# Patient Record
Sex: FEMALE | Race: WHITE | NOT HISPANIC OR LATINO | Employment: UNEMPLOYED | URBAN - METROPOLITAN AREA
[De-identification: names, ages, dates, MRNs, and addresses within clinical notes are randomized per-mention and may not be internally consistent; named-entity substitution may affect disease eponyms.]

---

## 2023-01-31 ENCOUNTER — TRANSCRIBE ORDERS (OUTPATIENT)
Dept: ADMINISTRATIVE | Facility: HOSPITAL | Age: 43
End: 2023-01-31
Payer: COMMERCIAL

## 2023-01-31 DIAGNOSIS — M54.2 CERVICAL PAIN: Primary | ICD-10-CM

## 2023-02-13 ENCOUNTER — HOSPITAL ENCOUNTER (OUTPATIENT)
Dept: PAIN MEDICINE | Facility: HOSPITAL | Age: 43
Discharge: HOME | End: 2023-02-13
Payer: COMMERCIAL

## 2024-11-01 ENCOUNTER — OFFICE VISIT (OUTPATIENT)
Dept: ORTHOPEDIC SURGERY | Facility: CLINIC | Age: 44
End: 2024-11-01
Payer: COMMERCIAL

## 2024-11-01 VITALS — BODY MASS INDEX: 27.12 KG/M2 | TEMPERATURE: 98.7 F | WEIGHT: 172.8 LBS | HEIGHT: 67 IN

## 2024-11-01 DIAGNOSIS — M75.40 IMPINGEMENT SYNDROME OF SHOULDER REGION, UNSPECIFIED LATERALITY: Primary | ICD-10-CM

## 2024-11-01 DIAGNOSIS — M25.511 BILATERAL SHOULDER PAIN, UNSPECIFIED CHRONICITY: ICD-10-CM

## 2024-11-01 DIAGNOSIS — M25.512 BILATERAL SHOULDER PAIN, UNSPECIFIED CHRONICITY: ICD-10-CM

## 2024-11-01 RX ORDER — DIPHENOXYLATE HYDROCHLORIDE AND ATROPINE SULFATE 2.5; .025 MG/1; MG/1
TABLET ORAL
COMMUNITY

## 2024-11-01 RX ORDER — LIDOCAINE HYDROCHLORIDE 10 MG/ML
2 INJECTION, SOLUTION EPIDURAL; INFILTRATION; INTRACAUDAL; PERINEURAL
Status: COMPLETED | OUTPATIENT
Start: 2024-11-01 | End: 2024-11-01

## 2024-11-01 RX ORDER — MELOXICAM 15 MG/1
TABLET ORAL
Qty: 30 TABLET | Refills: 0 | Status: SHIPPED | OUTPATIENT
Start: 2024-11-01

## 2024-11-01 RX ORDER — ESCITALOPRAM OXALATE 10 MG/1
10 TABLET ORAL DAILY
COMMUNITY
Start: 2024-08-05

## 2024-11-01 RX ORDER — METHYLPREDNISOLONE ACETATE 80 MG/ML
80 INJECTION, SUSPENSION INTRA-ARTICULAR; INTRALESIONAL; INTRAMUSCULAR; SOFT TISSUE
Status: COMPLETED | OUTPATIENT
Start: 2024-11-01 | End: 2024-11-01

## 2024-11-01 RX ORDER — FEXOFENADINE HCL 60 MG/1
60 TABLET, FILM COATED ORAL DAILY
COMMUNITY

## 2024-11-01 RX ADMIN — LIDOCAINE HYDROCHLORIDE 2 ML: 10 INJECTION, SOLUTION EPIDURAL; INFILTRATION; INTRACAUDAL; PERINEURAL at 11:10

## 2024-11-01 RX ADMIN — METHYLPREDNISOLONE ACETATE 80 MG: 80 INJECTION, SUSPENSION INTRA-ARTICULAR; INTRALESIONAL; INTRAMUSCULAR; SOFT TISSUE at 11:10

## 2024-11-01 NOTE — PROGRESS NOTES
[facility]              New Problem      Patient Name: Bella Jones  : 1980  Primary Care Physician: Provider, No Known        Chief Complaint: Bilateral shoulder pain    HPI:   Bella Jones is a 44 y.o. year old who presents today for evaluation.  History of Present Illness  The patient presents for evaluation of bilateral shoulder pain.    She reports experiencing pain in both shoulders for the past month, with no specific injury or event triggering the discomfort. The pain is located at the top of her shoulders and extends slightly downwards on both sides. As a right-handed individual, she has attempted to alleviate the pain with Aleve, but without success. She takes Aleve twice daily. The pain intensifies upon waking and when drying her back with a towel. She also experiences discomfort when putting on a jacket. She reports no numbness or tingling sensations. She has not been ill recently and has not received any vaccines.    She has a history of degenerative disc disease and carpal tunnel syndrome, which were diagnosed a few years ago following an MRI due to neck pain and numbness. She has previously received a cortisone injection for tendinitis, which was effective and the condition did not recur.    ALLERGIES  She is allergic to CODEINE and AMOXICILLIN.    Past Medical/Surgical, Social and Family History:  I have reviewed and/or updated pertinent history as noted in the medical record including:  No past medical history on file.  No past surgical history on file.  Social History     Occupational History    Not on file   Tobacco Use    Smoking status: Not on file    Smokeless tobacco: Not on file   Substance and Sexual Activity    Alcohol use: Not on file    Drug use: Not on file    Sexual activity: Not on file          Allergies:   Allergies   Allergen Reactions    Amoxicillin Itching    Codeine Nausea And Vomiting       Medications:   Home Medications:  Current Outpatient Medications  on File Prior to Visit   Medication Sig    escitalopram (LEXAPRO) 10 MG tablet Take 1 tablet by mouth Daily.    fexofenadine (ALLEGRA) 60 MG tablet Take 1 tablet by mouth Daily.    multivitamin (THERAGRAN) tablet tablet Take  by mouth.    OMEPRAZOLE PO Take  by mouth.     No current facility-administered medications on file prior to visit.         ROS:  ROS negative except as listed in the HPI.    Physical Exam:   44 y.o. female  Body mass index is 27.06 kg/m²., 78.4 kg (172 lb 12.8 oz)  Vitals:    11/01/24 1051   Temp: 98.7 °F (37.1 °C)     General: Alert, cooperative, appears well and in no observable distress. Appears stated age and BMI as listed above.  HEENT: Normocephalic, atraumatic on external visual inspection.  CV: No significant peripheral edema.  Respiratory: Normal respiratory effort.  Skin: Warm & well perfused; appropriate skin turgor.  Psych: Appropriate mood & affect.  Neuro: Gross sensation and motor intact in affected extremity/extremities.  Vascular: Peripheral pulses palpable in affected extremity/extremities.   Physical Exam      MSK Exam:    Bilateral Shoulder  No obvious deformities or wounds.   No redness or significant swelling.  +Definitive painful arc.  +Impingement signs  Flexion 180  ER 70  IR lumbar spine  Strength is 4+/5 strength with isometric testing of the rotator cuff and painful    Brief examination of the cervical spine did not reproduce any specific radicular pattern or symptoms.   Strength is reasonable and symmetric in the upper extremities.       Radiology:    Results      The following X-rays were ordered/reviewed today to evaluate the patient's symptoms: Shoulder: AP, Scapular Y and Axillary Lateral of both shoulder(s) show Left shoulder has some mild acromioclavicular joint changes and a little bit of sclerosis at the greater tuberosity of the humerus.  There is a very small ossific density at the inferior aspect of the glenoid that could represent a little degenerative  osteophyte or loose body otherwise she has good glenohumeral space no significant bony pathology is otherwise noted.  On the right shoulder she has a little more acromioclavicular joint arthritis, she has similar changes at the greater tuberosity of the humerus humeral head is well-seated in the glenoid and she has good subacromial and glenohumeral space.. There are no prior films available for direct comparison.    Procedure:   See Procedure Note: The potential risks and benefits of performing a diagnostic and therapeutic injection were discussed with the patient prior to procedure. Risks include, but are not limited to infection, swelling, transient increase in pain, bleeding, bruising. Patient was advised that injections are a diagnostic and therapeutic tool meaning they may not alleviate symptoms at all, or may only provide partial or temporary relief. Injection precautions and aftercare discussed.    Large Joint Arthrocentesis: R subacromial bursa  Date/Time: 11/1/2024 11:10 AM  Consent given by: patient  Site marked: site marked  Timeout: Immediately prior to procedure a time out was called to verify the correct patient, procedure, equipment, support staff and site/side marked as required   Supporting Documentation  Indications: pain   Procedure Details  Location: shoulder - R subacromial bursa  Preparation: Patient was prepped and draped in the usual sterile fashion  Needle gauge: 21G.  Approach: posterior  Medications administered: 80 mg methylPREDNISolone acetate 80 MG/ML; 2 mL lidocaine PF 1% 1 %  Patient tolerance: patient tolerated the procedure well with no immediate complications      Large Joint Arthrocentesis: L subacromial bursa  Date/Time: 11/1/2024 11:10 AM  Consent given by: patient  Site marked: site marked  Timeout: Immediately prior to procedure a time out was called to verify the correct patient, procedure, equipment, support staff and site/side marked as required   Supporting  Documentation  Indications: pain   Procedure Details  Location: shoulder - L subacromial bursa  Preparation: Patient was prepped and draped in the usual sterile fashion  Needle gauge: 21G.  Approach: posterior  Medications administered: 80 mg methylPREDNISolone acetate 80 MG/ML; 2 mL lidocaine PF 1% 1 %  Patient tolerance: patient tolerated the procedure well with no immediate complications        Misc. Data/Labs: N/A    Assessment & Plan:      ICD-10-CM ICD-9-CM   1. Impingement syndrome of shoulder region, unspecified laterality  M75.40 726.2   2. Bilateral shoulder pain, unspecified chronicity  M25.511 719.41    M25.512      New Medications Ordered This Visit   Medications    meloxicam (MOBIC) 15 MG tablet     Si PO Daily with food. Do not take with other NSAIDS.     Dispense:  30 tablet     Refill:  0     Orders Placed This Encounter   Procedures    Large Joint Arthrocentesis: R subacromial bursa    Large Joint Arthrocentesis: L subacromial bursa    XR Shoulder 2+ View Bilateral       Assessment & Plan  1. Bilateral shoulder pain.  The patient's symptoms suggest possible rotator cuff impingement or bicep tendinitis, characterized by inflammation and irritation in the front of the shoulder radiating down the arm. The presence of arthritis in the AC joint is likely age-related and not a cause for concern but could potentially contribute. A stronger anti-inflammatory medication will be prescribed for a couple of weeks, I have sent meloxicam over with strict precautions. Physical therapy is recommended to strengthen the shoulder and prevent recurrence, we will hold off on this for now and see how the injections work as a diagnostic tool. A cortisone injection will be administered today, with the understanding that it may take up to 2 weeks to see improvement. If there is no improvement after 2 weeks, further investigation will be required to identify the underlying issue.      Continue with activity  modifications as needed/discussed.  Continue ICE and/or HEAT PRN.  Recommend to continue activity as tolerated, focus on stretching and strengthening of the joint.    Focus on posture and body mechanics to improve/prevent symptoms.   Patient encouraged to call with questions or concerns prior to follow up.  Will discuss with attending as needed.  Consider additional referrals, work up and/or advanced imaging as indicated or if patient fails to respond to conservative care.    Return in about 4 weeks (around 11/29/2024) for Recheck. If symptoms change call for sooner appt..    Patient or patient representative verbalized consent for the use of Ambient Listening during the visit with  ROSA Gavin for chart documentation. 11/1/2024  15:24 EDT     ROSA Urban    Dictation software was used to complete a portion or all of this note.

## 2024-11-26 DIAGNOSIS — M25.511 BILATERAL SHOULDER PAIN, UNSPECIFIED CHRONICITY: Primary | ICD-10-CM

## 2024-11-26 DIAGNOSIS — M25.512 BILATERAL SHOULDER PAIN, UNSPECIFIED CHRONICITY: Primary | ICD-10-CM

## 2024-11-26 RX ORDER — MELOXICAM 15 MG/1
TABLET ORAL
Qty: 30 TABLET | Refills: 0 | Status: SHIPPED | OUTPATIENT
Start: 2024-11-26

## 2025-01-28 DIAGNOSIS — M25.511 BILATERAL SHOULDER PAIN, UNSPECIFIED CHRONICITY: ICD-10-CM

## 2025-01-28 DIAGNOSIS — M25.512 BILATERAL SHOULDER PAIN, UNSPECIFIED CHRONICITY: ICD-10-CM

## 2025-01-28 RX ORDER — MELOXICAM 15 MG/1
TABLET ORAL
Qty: 30 TABLET | Refills: 0 | Status: SHIPPED | OUTPATIENT
Start: 2025-01-28 | End: 2025-02-03

## 2025-02-03 ENCOUNTER — OFFICE VISIT (OUTPATIENT)
Dept: ORTHOPEDIC SURGERY | Facility: CLINIC | Age: 45
End: 2025-02-03
Payer: COMMERCIAL

## 2025-02-03 VITALS — BODY MASS INDEX: 29.32 KG/M2 | HEIGHT: 67 IN | TEMPERATURE: 98.1 F | WEIGHT: 186.8 LBS

## 2025-02-03 DIAGNOSIS — M25.819 IMPINGEMENT OF SHOULDER: Primary | ICD-10-CM

## 2025-02-03 DIAGNOSIS — M25.512 BILATERAL SHOULDER PAIN, UNSPECIFIED CHRONICITY: ICD-10-CM

## 2025-02-03 DIAGNOSIS — M25.511 BILATERAL SHOULDER PAIN, UNSPECIFIED CHRONICITY: ICD-10-CM

## 2025-02-03 RX ADMIN — METHYLPREDNISOLONE ACETATE 80 MG: 80 INJECTION, SUSPENSION INTRA-ARTICULAR; INTRALESIONAL; INTRAMUSCULAR; SOFT TISSUE at 15:10

## 2025-02-03 RX ADMIN — METHYLPREDNISOLONE ACETATE 80 MG: 80 INJECTION, SUSPENSION INTRA-ARTICULAR; INTRALESIONAL; INTRAMUSCULAR; SOFT TISSUE at 15:09

## 2025-02-03 RX ADMIN — LIDOCAINE HYDROCHLORIDE 2 ML: 10 INJECTION, SOLUTION EPIDURAL; INFILTRATION; INTRACAUDAL; PERINEURAL at 15:10

## 2025-02-03 RX ADMIN — LIDOCAINE HYDROCHLORIDE 2 ML: 10 INJECTION, SOLUTION EPIDURAL; INFILTRATION; INTRACAUDAL; PERINEURAL at 15:09

## 2025-02-03 NOTE — PROGRESS NOTES
[facility]              Follow Up      Patient Name: Bella Jones  : 1980  Primary Care Physician: Provider, No Known        Chief Complaint:  Bilateral shoulder pain    HPI:   Bella Jones is a 45 y.o. year old who presents today for evaluation.  History of Present Illness  The patient presents for evaluation of bilateral shoulder pain.    She reports a recurrence of bilateral shoulder pain, which began approximately 1 month ago. The right shoulder is more painful than the left. She experienced a period of relief for several months prior to this episode. The pain was severe enough to disrupt her sleep last night. She also notes occasional tenderness upon touch and discomfort when changing positions in bed. She has not been using Voltaren gel. She has discontinued the use of meloxicam due to perceived ineffectiveness and has been self-medicating with 2 tablets of naproxen instead. She recalls a previous episode of tingling sensations originating from her neck a few years ago.    I last saw her about 3 months ago, we suspected RTC impingement and did an injection as a diagnostic and therapeutic tool. She did have good relief with the injections but her pain as returned.    MEDICATIONS  Discontinued: Meloxicam.  Current: Naproxen, Lexapro.       Past Medical/Surgical, Social and Family History:  I have reviewed and/or updated pertinent history as noted in the medical record including:  History reviewed. No pertinent past medical history.  History reviewed. No pertinent surgical history.  Social History     Occupational History    Not on file   Tobacco Use    Smoking status: Never     Passive exposure: Never    Smokeless tobacco: Never   Vaping Use    Vaping status: Never Used   Substance and Sexual Activity    Alcohol use: Yes    Drug use: Never    Sexual activity: Yes     Partners: Male          Allergies:   Allergies   Allergen Reactions    Amoxicillin Itching    Codeine Nausea And Vomiting        Medications:   Home Medications:  Current Outpatient Medications on File Prior to Visit   Medication Sig    escitalopram (LEXAPRO) 10 MG tablet Take 1 tablet by mouth Daily.    fexofenadine (ALLEGRA) 60 MG tablet Take 1 tablet by mouth Daily.    multivitamin (THERAGRAN) tablet tablet Take  by mouth.    OMEPRAZOLE PO Take  by mouth.     No current facility-administered medications on file prior to visit.         ROS:  ROS negative except as listed in the HPI.    Physical Exam:   45 y.o. female  Body mass index is 29.26 kg/m²., 84.7 kg (186 lb 12.8 oz)  Vitals:    02/03/25 1513   Temp: 98.1 °F (36.7 °C)     General: Alert, cooperative, appears well and in no observable distress. Appears stated age and BMI as listed above.  HEENT: Normocephalic, atraumatic on external visual inspection.  CV: No significant peripheral edema.  Respiratory: Normal respiratory effort.  Skin: Warm & well perfused; appropriate skin turgor.  Psych: Appropriate mood & affect.  Neuro: Gross sensation and motor intact in affected extremity/extremities.  Vascular: Peripheral pulses palpable in affected extremity/extremities.   Physical Exam  Bilateral shoulder exam was performed.    MSK Exam:  Bilateral Shoulder  No obvious deformities or wounds.   No redness or significant swelling.  +Definitive painful arc.  +Impingement signs  Flexion 180  ER 70  IR lumbar spine  Strength is 4+/5 strength with isometric testing of the rotator cuff and painful     Brief examination of the cervical spine did not reproduce any specific radicular pattern or symptoms.   Strength is reasonable and symmetric in the upper extremities.     Radiology:    No new images were needed at the visit today.     11/01/2024: Shoulder: AP, Scapular Y and Axillary Lateral of both shoulder(s) show Left shoulder has some mild acromioclavicular joint changes and a little bit of sclerosis at the greater tuberosity of the humerus.  There is a very small ossific density at the  inferior aspect of the glenoid that could represent a little degenerative osteophyte or loose body otherwise she has good glenohumeral space no significant bony pathology is otherwise noted.  On the right shoulder she has a little more acromioclavicular joint arthritis, she has similar changes at the greater tuberosity of the humerus humeral head is well-seated in the glenoid and she has good subacromial and glenohumeral space.. There are no prior films available for direct comparison.   Results      Procedure:   See Procedure Note: The potential risks and benefits of performing a diagnostic and therapeutic injection were discussed with the patient prior to procedure. Risks include, but are not limited to infection, swelling, transient increase in pain, bleeding, bruising. Patient was advised that injections are a diagnostic and therapeutic tool meaning they may not alleviate symptoms at all, or may only provide partial or temporary relief. Injection precautions and aftercare discussed.      [facility] [place]/Labs: N/A    Assessment & Plan:        ICD-10-CM ICD-9-CM   1. Impingement of shoulder  M25.819 719.81   2. Bilateral shoulder pain, unspecified chronicity  M25.511 719.41    M25.512      No orders of the defined types were placed in this encounter.    Orders Placed This Encounter   Procedures    Large Joint Arthrocentesis: R subacromial bursa    Large Joint Arthrocentesis: L subacromial bursa    Ambulatory Referral to Physical Therapy for Evaluation & Treatment       Assessment & Plan  1. Bilateral shoulder pain.  The patient's symptoms suggest a recurrence of shoulder pain rather than neck pain, given the significant relief previously achieved through injections. I recommended Voltaren gel, with instructions to apply it to the anterior and lateral aspects of the shoulder where discomfort is most pronounced. A referral for physical therapy has also been made, with the aim of strengthening the upper back and shoulder  muscles to prevent future episodes of pain. She has been advised to contact the office within 6 weeks to report on her progress. If the pain persists beyond this period, an MRI will be ordered.    PROCEDURE  The patient previously received an injection for shoulder pain, which provided significant relief.    Continue with activity modifications as needed/discussed.  Continue ICE and/or HEAT PRN.  Recommend to continue activity as tolerated, focus on stretching and strengthening of the joint.    Focus on posture and body mechanics to improve/prevent symptoms.   Patient encouraged to call with questions or concerns prior to follow up.  Will discuss with attending as needed.  Consider additional referrals, work up and/or advanced imaging as indicated or if patient fails to respond to conservative care.    Return in about 6 weeks (around 3/17/2025) for Recheck. If symptoms change call for sooner appt..  Patient or patient representative verbalized consent for the use of Ambient Listening during the visit with  ROSA Gavin for chart documentation. 2/6/2025  09:58 EST    ROSA Urban    Dictation software was used to complete a portion or all of this note.    Large Joint Arthrocentesis: R subacromial bursa  Date/Time: 2/3/2025 3:09 PM  Consent given by: patient  Site marked: site marked  Timeout: Immediately prior to procedure a time out was called to verify the correct patient, procedure, equipment, support staff and site/side marked as required   Supporting Documentation  Indications: pain   Procedure Details  Location: shoulder - R subacromial bursa  Preparation: Patient was prepped and draped in the usual sterile fashion  Needle gauge: 21G.  Approach: posterior  Medications administered: 80 mg methylPREDNISolone acetate 80 MG/ML; 2 mL lidocaine PF 1% 1 %  Patient tolerance: patient tolerated the procedure well with no immediate complications      Large Joint Arthrocentesis: L subacromial  bursa  Date/Time: 2/3/2025 3:10 PM  Consent given by: patient  Site marked: site marked  Timeout: Immediately prior to procedure a time out was called to verify the correct patient, procedure, equipment, support staff and site/side marked as required   Supporting Documentation  Indications: pain   Procedure Details  Location: shoulder - L subacromial bursa  Preparation: Patient was prepped and draped in the usual sterile fashion  Needle gauge: 21G.  Approach: posterior  Medications administered: 80 mg methylPREDNISolone acetate 80 MG/ML; 2 mL lidocaine PF 1% 1 %  Patient tolerance: patient tolerated the procedure well with no immediate complications

## 2025-02-06 RX ORDER — METHYLPREDNISOLONE ACETATE 80 MG/ML
80 INJECTION, SUSPENSION INTRA-ARTICULAR; INTRALESIONAL; INTRAMUSCULAR; SOFT TISSUE
Status: COMPLETED | OUTPATIENT
Start: 2025-02-03 | End: 2025-02-03

## 2025-02-06 RX ORDER — LIDOCAINE HYDROCHLORIDE 10 MG/ML
2 INJECTION, SOLUTION EPIDURAL; INFILTRATION; INTRACAUDAL; PERINEURAL
Status: COMPLETED | OUTPATIENT
Start: 2025-02-03 | End: 2025-02-03

## 2025-07-09 ENCOUNTER — HOSPITAL ENCOUNTER (EMERGENCY)
Facility: HOSPITAL | Age: 45
Discharge: HOME | End: 2025-07-09
Attending: EMERGENCY MEDICINE | Admitting: EMERGENCY MEDICINE
Payer: COMMERCIAL

## 2025-07-09 ENCOUNTER — APPOINTMENT (OUTPATIENT)
Dept: GENERAL RADIOLOGY | Facility: HOSPITAL | Age: 45
End: 2025-07-09
Payer: COMMERCIAL

## 2025-07-09 ENCOUNTER — APPOINTMENT (OUTPATIENT)
Dept: CARDIOLOGY | Facility: HOSPITAL | Age: 45
End: 2025-07-09
Payer: COMMERCIAL

## 2025-07-09 VITALS
RESPIRATION RATE: 18 BRPM | TEMPERATURE: 98.2 F | OXYGEN SATURATION: 100 % | DIASTOLIC BLOOD PRESSURE: 97 MMHG | HEIGHT: 67 IN | BODY MASS INDEX: 28.25 KG/M2 | HEART RATE: 89 BPM | SYSTOLIC BLOOD PRESSURE: 151 MMHG | WEIGHT: 180 LBS

## 2025-07-09 DIAGNOSIS — I10 HYPERTENSION, UNSPECIFIED TYPE: ICD-10-CM

## 2025-07-09 DIAGNOSIS — M25.462 KNEE EFFUSION, LEFT: ICD-10-CM

## 2025-07-09 DIAGNOSIS — M25.561 ACUTE PAIN OF RIGHT KNEE: Primary | ICD-10-CM

## 2025-07-09 LAB
BH CV LOWER VASCULAR LEFT COMMON FEMORAL AUGMENT: NORMAL
BH CV LOWER VASCULAR LEFT COMMON FEMORAL COMPETENT: NORMAL
BH CV LOWER VASCULAR LEFT COMMON FEMORAL COMPRESS: NORMAL
BH CV LOWER VASCULAR LEFT COMMON FEMORAL PHASIC: NORMAL
BH CV LOWER VASCULAR LEFT COMMON FEMORAL SPONT: NORMAL
BH CV LOWER VASCULAR RIGHT COMMON FEMORAL AUGMENT: NORMAL
BH CV LOWER VASCULAR RIGHT COMMON FEMORAL COMPETENT: NORMAL
BH CV LOWER VASCULAR RIGHT COMMON FEMORAL COMPRESS: NORMAL
BH CV LOWER VASCULAR RIGHT COMMON FEMORAL PHASIC: NORMAL
BH CV LOWER VASCULAR RIGHT COMMON FEMORAL SPONT: NORMAL
BH CV LOWER VASCULAR RIGHT DISTAL FEMORAL COMPRESS: NORMAL
BH CV LOWER VASCULAR RIGHT GASTRONEMIUS COMPRESS: NORMAL
BH CV LOWER VASCULAR RIGHT GREATER SAPH AK COMPRESS: NORMAL
BH CV LOWER VASCULAR RIGHT GREATER SAPH BK COMPRESS: NORMAL
BH CV LOWER VASCULAR RIGHT LESSER SAPH COMPRESS: NORMAL
BH CV LOWER VASCULAR RIGHT MID FEMORAL AUGMENT: NORMAL
BH CV LOWER VASCULAR RIGHT MID FEMORAL COMPETENT: NORMAL
BH CV LOWER VASCULAR RIGHT MID FEMORAL COMPRESS: NORMAL
BH CV LOWER VASCULAR RIGHT MID FEMORAL PHASIC: NORMAL
BH CV LOWER VASCULAR RIGHT MID FEMORAL SPONT: NORMAL
BH CV LOWER VASCULAR RIGHT PERONEAL COMPRESS: NORMAL
BH CV LOWER VASCULAR RIGHT POPLITEAL AUGMENT: NORMAL
BH CV LOWER VASCULAR RIGHT POPLITEAL COMPETENT: NORMAL
BH CV LOWER VASCULAR RIGHT POPLITEAL COMPRESS: NORMAL
BH CV LOWER VASCULAR RIGHT POPLITEAL PHASIC: NORMAL
BH CV LOWER VASCULAR RIGHT POPLITEAL SPONT: NORMAL
BH CV LOWER VASCULAR RIGHT POSTERIOR TIBIAL COMPRESS: NORMAL
BH CV LOWER VASCULAR RIGHT PROFUNDA FEMORAL COMPRESS: NORMAL
BH CV LOWER VASCULAR RIGHT PROXIMAL FEMORAL COMPRESS: NORMAL
BH CV LOWER VASCULAR RIGHT SAPHENOFEMORAL JUNCTION COMPRESS: NORMAL
BH CV VAS PRELIMINARY FINDINGS SCRIPTING: 1

## 2025-07-09 PROCEDURE — 99284 EMERGENCY DEPT VISIT MOD MDM: CPT

## 2025-07-09 PROCEDURE — 93971 EXTREMITY STUDY: CPT

## 2025-07-09 PROCEDURE — 93971 EXTREMITY STUDY: CPT | Performed by: SURGERY

## 2025-07-09 PROCEDURE — 73560 X-RAY EXAM OF KNEE 1 OR 2: CPT

## 2025-07-09 RX ORDER — IBUPROFEN 800 MG/1
800 TABLET, FILM COATED ORAL ONCE
Status: COMPLETED | OUTPATIENT
Start: 2025-07-09 | End: 2025-07-09

## 2025-07-09 RX ORDER — ACETAMINOPHEN 500 MG
1000 TABLET ORAL ONCE
Status: COMPLETED | OUTPATIENT
Start: 2025-07-09 | End: 2025-07-09

## 2025-07-09 RX ORDER — IBUPROFEN 600 MG/1
600 TABLET, FILM COATED ORAL EVERY 6 HOURS PRN
Qty: 30 TABLET | Refills: 0 | Status: SHIPPED | OUTPATIENT
Start: 2025-07-09

## 2025-07-09 RX ORDER — ACETAMINOPHEN 500 MG
1000 TABLET ORAL EVERY 8 HOURS PRN
Qty: 30 TABLET | Refills: 0 | Status: SHIPPED | OUTPATIENT
Start: 2025-07-09

## 2025-07-09 RX ADMIN — ACETAMINOPHEN 1000 MG: 500 TABLET, FILM COATED ORAL at 19:36

## 2025-07-09 RX ADMIN — IBUPROFEN 800 MG: 800 TABLET, FILM COATED ORAL at 19:36

## 2025-07-09 NOTE — ED PROVIDER NOTES
EMERGENCY DEPARTMENT ENCOUNTER  Room Number:  30/30  Date of encounter:  7/9/2025  PCP: Stephany Osuna APRN  Patient Care Team:  Stephany Osuna APRN as PCP - General (Nurse Practitioner)  Wilmer Ritchie APRN ([facility])     HPI:  Context: Bella Jones is a 45 y.o. female who presents to the ED c/o chief complaint of pain.  Patient reports that she has been having right knee pain for the last 10 to 11 days.  No fall or trauma, no strain inside event.  Patient reports pain is worsened with movement, patient also complaining of pain in her anterior thigh as well as some pain in her calf.  No redness warmth or swelling, no back pain, no radicular pain, no weakness or numbness.    MEDICAL HISTORY REVIEW  Reviewed in EPIC    PAST MEDICAL HISTORY  Active Ambulatory Problems     Diagnosis Date Noted    No Active Ambulatory Problems     Resolved Ambulatory Problems     Diagnosis Date Noted    No Resolved Ambulatory Problems     No Additional Past Medical History       PAST SURGICAL HISTORY  No past surgical history on file.    FAMILY HISTORY  No family history on file.    SOCIAL HISTORY  Social History     Socioeconomic History    Marital status:    Tobacco Use    Smoking status: Never     Passive exposure: Never    Smokeless tobacco: Never   Vaping Use    Vaping status: Never Used   Substance and Sexual Activity    Alcohol use: Yes    Drug use: Never    Sexual activity: Yes     Partners: Male       ALLERGIES  Amoxicillin and Codeine    The patient's allergies have been reviewed    PHYSICAL EXAM  I have reviewed the triage vital signs and nursing notes.  ED Triage Vitals   Temp Heart Rate Resp BP SpO2   07/09/25 1818 07/09/25 1818 07/09/25 1818 07/09/25 1819 07/09/25 1818   98.1 °F (36.7 °C) 81 18 (!) 170/105 99 %      Temp src Heart Rate Source Patient Position BP Location FiO2 (%)   -- -- -- -- --              General: No acute distress.  HENT: NCAT, PERRL, Nares patent.  Eyes: no  scleral icterus.  Neck: trachea midline, no ROM limitations.  CV: regular rhythm, regular rate.  Respiratory: normal effort, CTAB.  Abdomen: soft, nondistended, NTTP, no rebound tenderness, no guarding or rigidity.  Musculoskeletal: no deformity.  Right lower extremity: Normal in appearance, patient does have some tenderness along the anterior joint line of the knee, no crepitus or deformity, no swelling or effusion, full extension, slightly limited flexion secondary to pain, no redness warmth or swelling, no palpable cords, negative Homans.  Neuro: alert, moves all extremities, follows commands.  Skin: warm, dry.    LAB RESULTS  Recent Results (from the past 24 hours)   Duplex Venous Lower Extremity Right    Collection Time: 07/09/25  6:51 PM   Result Value Ref Range    Right Common Femoral Spont Y     Right Common Femoral Competent Y     Right Common Femoral Phasic Y     Right Common Femoral Compress C     Right Common Femoral Augment Y     Right Saphenofemoral Junction Compress C     Right Profunda Femoral Compress C     Right Proximal Femoral Compress C     Right Mid Femoral Spont Y     Right Mid Femoral Competent Y     Right Mid Femoral Phasic Y     Right Mid Femoral Compress C     Right Mid Femoral Augment Y     Right Distal Femoral Compress C     Right Popliteal Spont Y     Right Popliteal Competent Y     Right Popliteal Phasic Y     Right Popliteal Compress C     Right Popliteal Augment Y     Right Posterior Tibial Compress C     Right Peroneal Compress C     Right Gastronemius Compress C     Right Greater Saph AK Compress C     Right Greater Saph BK Compress C     Right Lesser Saph Compress C     Left Common Femoral Spont Y     Left Common Femoral Competent Y     Left Common Femoral Phasic Y     Left Common Femoral Compress C     Left Common Femoral Augment Y     BH CV VAS PRELIMINARY FINDINGS SCRIPTING 1.0        I ordered the above labs and reviewed the results.    RADIOLOGY  XR Knee 1 or 2 View  Right  Result Date: 7/9/2025  XR KNEE 1 OR 2 VW RIGHT-  INDICATIONS: Pain  TECHNIQUE: 2 VIEWS OF THE RIGHT KNEE  COMPARISON: None available  FINDINGS:  No acute fracture, erosion, or dislocation is identified. Trace knee effusion. If there is clinical suspicion for internal derangement of the knee, MRI could be considered for further evaluation.       As described.    This report was finalized on 7/9/2025 7:29 PM by Dr. Robin Reed M.D on Workstation: [id number]        I ordered the above noted radiological studies. I reviewed the images and results. I agree with the radiologist interpretation.    PROCEDURES  Procedures    MEDICATIONS GIVEN IN ER  Medications   ibuprofen (ADVIL,MOTRIN) tablet 800 mg (800 mg Oral Given 7/9/25 1936)   acetaminophen (TYLENOL) tablet 1,000 mg (1,000 mg Oral Given 7/9/25 1936)       PROGRESS, DATA ANALYSIS, CONSULTS, AND MEDICAL DECISION MAKING  A complete history and physical exam have been performed.  All available laboratory and imaging results have been reviewed by myself prior to disposition.    MDM    After the initial H&P, I discussed pertinent information from history and physical exam with patient/family.  Discussed differential diagnosis.  Discussed plan for ED evaluation/workup/treatment.  All questions answered.  Patient/family is agreeable with plan.  ED Course as of 07/09/25 1954 Wed Jul 09, 2025   1855 Preliminary report per ultrasound is patient is negative for DVT/SVT in right lower extremity. [JG]   1932 I reviewed right knee x-rays in PACS, no fracture per my read. [JG]      ED Course User Index  [JG] Clarke Rowley MD       AS OF 19:54 EDT VITALS:    BP - 151/97  HR - 70  TEMP - 98.1 °F (36.7 °C)  O2 SATS - 98%    DIAGNOSIS  Final diagnoses:   Acute pain of right knee   Hypertension, unspecified type   Knee effusion, left         DISPOSITION  DISCHARGE    Patient discharged in stable condition.    Reviewed implications of results, diagnosis, meds,  responsibility to follow up, warning signs and symptoms of possible worsening, potential complications and reasons to return to ER.    Patient/Family voiced understanding of above instructions.    Discussed plan for discharge, as there is no emergent indication for admission. Patient referred to primary care provider for BP management due to today's BP. Pt/family is agreeable and understands need for follow up and repeat testing.  Pt is aware that discharge does not mean that nothing is wrong but it indicates no emergency is present that requires admission and they must continue care with follow-up as given below or physician of their choice.     FOLLOW-UP  Stephany Osuna N, APRN  [address] [facility] [id number] [phone]    Schedule an appointment as soon as possible for a visit in 2 days      Jerry Wilkins II, MD  [address] [facility] [address]  [place] [id number] [phone]    Schedule an appointment as soon as possible for a visit in 2 days           Medication List        New Prescriptions      acetaminophen 500 MG tablet  Commonly known as: TYLENOL  Take 2 tablets by mouth Every 8 (Eight) Hours As Needed for Mild Pain.     Diclofenac Sodium 1 % gel gel  Commonly known as: VOLTAREN  Apply 4 g topically to the appropriate area as directed 4 (Four) Times a Day As Needed (pain).     ibuprofen 600 MG tablet  Commonly known as: ADVIL,MOTRIN  Take 1 tablet by mouth Every 6 (Six) Hours As Needed for Mild Pain.               Where to Get Your Medications        These medications were sent to [id number] - [place], [place] - [address] [address] AT SEC OF [place] [address] - [phone] [facility] [phone]   [address] [place] [address] [address], [facility] [address]      Phone: [phone]   acetaminophen 500 MG tablet  Diclofenac Sodium 1 % gel gel  ibuprofen 600 MG tablet            Clarke Rowley MD  07/09/25 6293

## 2025-07-09 NOTE — ED TRIAGE NOTES
Pt reports rt leg/knee pain that started 10-11 days ago, states tender to touch, denies known injury, [facility]; recent travel on a cruise

## 2025-07-11 ENCOUNTER — OFFICE VISIT (OUTPATIENT)
Dept: ORTHOPEDIC SURGERY | Facility: CLINIC | Age: 45
End: 2025-07-11
Payer: COMMERCIAL

## 2025-07-11 VITALS — BODY MASS INDEX: 28.3 KG/M2 | TEMPERATURE: 98.3 F | HEIGHT: 67 IN | WEIGHT: 180.3 LBS

## 2025-07-11 DIAGNOSIS — M25.561 ACUTE PAIN OF RIGHT KNEE: ICD-10-CM

## 2025-07-11 DIAGNOSIS — M25.461 EFFUSION OF RIGHT KNEE: Primary | ICD-10-CM

## 2025-07-11 RX ORDER — METHYLPREDNISOLONE 4 MG/1
TABLET ORAL
Qty: 21 TABLET | Refills: 0 | Status: SHIPPED | OUTPATIENT
Start: 2025-07-11

## 2025-07-11 NOTE — PROGRESS NOTES
[facility]              New Problem      Patient Name: Bella Jones  : 1980  Primary Care Physician: Stephany Osuna APRN        Chief Complaint:  Right knee pain, leg pain and hypersensitivity    HPI:   Bella Jones is a 45 y.o. year old who presents today for evaluation.  History of Present Illness  The patient presents for evaluation of right knee pain.    She reports experiencing pain in her right knee and leg, with no recollection of any specific injury. The pain is described as sudden and severe, extending down to her ankle. She has sought emergency care twice, initially being treated for shingles or a pinched nerve with muscle relaxers, which provided no relief. During her second ER visit, an ultrasound and x-ray revealed fluid in the joint, which was attributed to her hip pain. The US was a doppler and negative for DVT. She recalls waking up unable to bear weight on her right knee, which remains sensitive to touch. The pain is more pronounced on the inside of her knee, causing discomfort during activities such as shaving or dressing. She also experiences tightness and difficulty bending her knee, particularly when getting in and out of a car.     The patient mentions that her knee was bothersome during a recent vacation, and occasionally feels a need to pop it while walking. She returned from her trip on 2025, but the symptoms did not start until 2025. She reports no changes in bowel or bladder function or incontinence. She is currently taking ibuprofen 600 mg and alternating with tylenol.  She was prescribed methocarbamol, naproxen, and gabapentin for three days due to shooting pains. After her ER visit, she discontinued these medications and was advised to take ibuprofen and Tylenol for inflammation. She was also prescribed Voltaren cream, which she applied a few times but found it caused discomfort due to its cold temperature.    SOCIAL HISTORY  Occupations: Not  currently working    Past Medical/Surgical, Social and Family History:  I have reviewed and/or updated pertinent history as noted in the medical record including:  History reviewed. No pertinent past medical history.  History reviewed. No pertinent surgical history.  Social History     Occupational History    Not on file   Tobacco Use    Smoking status: Never     Passive exposure: Never    Smokeless tobacco: Never   Vaping Use    Vaping status: Never Used   Substance and Sexual Activity    Alcohol use: Yes    Drug use: Never    Sexual activity: Yes     Partners: Male          Allergies:   Allergies   Allergen Reactions    Amoxicillin Itching    Codeine Nausea And Vomiting       Medications:   Home Medications:  Current Outpatient Medications on File Prior to Visit   Medication Sig    acetaminophen (TYLENOL) 500 MG tablet Take 2 tablets by mouth Every 8 (Eight) Hours As Needed for Mild Pain.    Diclofenac Sodium (VOLTAREN) 1 % gel gel Apply 4 g topically to the appropriate area as directed 4 (Four) Times a Day As Needed (pain).    escitalopram (LEXAPRO) 10 MG tablet Take 1 tablet by mouth Daily.    fexofenadine (ALLEGRA) 60 MG tablet Take 1 tablet by mouth Daily.    ibuprofen (ADVIL,MOTRIN) 600 MG tablet Take 1 tablet by mouth Every 6 (Six) Hours As Needed for Mild Pain.    multivitamin (THERAGRAN) tablet tablet Take  by mouth.    OMEPRAZOLE PO Take  by mouth.     No current facility-administered medications on file prior to visit.         ROS:  ROS negative except as listed in the HPI.    Physical Exam:   45 y.o. female  Body mass index is 28.24 kg/m²., 81.8 kg (180 lb 4.8 oz)  Vitals:    07/11/25 1355   Temp: 98.3 °F (36.8 °C)     General: Alert, cooperative, appears well and in no observable distress. Appears stated age and BMI as listed above.  HEENT: Normocephalic, atraumatic on external visual inspection.  CV: No significant peripheral edema.  Respiratory: Normal respiratory effort.  Skin: Warm & well perfused;  appropriate skin turgor.  Psych: Appropriate mood & affect.  Neuro: Gross sensation and motor intact in affected extremity/extremities.  Vascular: Peripheral pulses palpable in affected extremity/extremities.   Physical Exam  Musculoskeletal:  Gait: Difficulty with weightbearing, hobbling.  Right knee: Tenderness on palpation, pain with movement, popping sensation, and fluid accumulation.  Right leg: Hypersensitivity to touch, pain extending from knee to ankle.    MSK Exam:  Right Knee  No deformity or wounds appreciated. No significant redness or warmth.  Trace effusion noted  Tenderness along the joint line appreciated patellofemoral compartment. She does have some hypersensitivity over the skin somewhat diffuse. No rashes or lesions.  ROM 2-100, +crepitus  Ligamentous exam grossly stable  Negative Leeroy  +Bounce Home  Quad strength 4 /5 and painful, Ham 5/5, hip flexion 4+/5. DF/PF 5/5     Left Knee  No deformity or wounds appreciated. No significant redness or warmth.  No significant effusion noted.  No significant tenderness appreciated about the joint.  ROM 0-130 and painless.  Ligamentous exam grossly stable  Quad strength 4+ to 5/5    Brief hip exam in the affected extremity(ies) grossly unremarkable.  Moves ankle and toes up and down, no significant pain or swelling in the foot, ankle or calf. No significant pain with passive SLR of the RLE.         Radiology:    The following X-rays were ordered/reviewed today to evaluate the patient's symptoms: Single Knee: AP standing and sunrise views of both knees, and lateral view of painful knee show Some mild degenerative changes in both knees on the AP view.  She still has adequate joint space in the weightbearing compartments.  Lateral view suggest little subtle patellofemoral arthritis I appreciate an effusion otherwise no acute or chronic bony pathology to account for symptoms.  I compared these with her prior films from the ER which were not on standing  films.  Results  Imaging   - Ultrasound of the knee: Negative for DVT   - X-ray of the knee: Showed fluid in the joint    Procedure:   N/A      Misc. Data/Labs: N/A    Assessment & Plan:      ICD-10-CM ICD-9-CM   1. Effusion of right knee  M25.461 719.06   2. Acute pain of right knee  M25.561 719.46     New Medications Ordered This Visit   Medications    methylPREDNISolone (MEDROL) 4 MG dose pack     Sig: Use as directed by package instructions     Dispense:  21 tablet     Refill:  0     Orders Placed This Encounter   Procedures    XR Knee 3 View Right       Assessment & Plan  1. Right knee pain:  The symptoms are somewhat atypical, with hypersensitivity suggesting a possible nerve issue. However, the absence of sciatica-like symptoms and good strength in distal extremities make me think this is more pressure from a swollen knee however her effusion doesn't seem that large.     A long car ride could have exacerbated this condition, with swelling developing over time. Especially given that she was quite a bit more active on her vacation, walking on the beach etc.     An oral steroid will be prescribed for a few days to assess its effectiveness. If it proves beneficial, it may indicate a nerve or inflammatory issue. Discontinue ibuprofen while on the oral steroid but continue with Tylenol. After the last day of the steroid course, resume ibuprofen. Frequent icing of the knee is recommended to help with swelling. If there is no noticeable improvement towards the end of the Dosepak course, contact us or send a message. I would like her to rest, stay off her feet as much as she can and even consider picking up a crutch in the opposite arm.     I might consider an IA injection if this does not improve but for today, given her presentation I think an oral steroid might offer more relief given her pattern of pain and discomfort.    Follow-up: A follow-up appointment is scheduled for 07/24/2025.    Continue with activity  modifications as needed/discussed.  Continue ICE and/or HEAT PRN.  Recommend to continue activity as tolerated, focus on quad and hip/core strengthening as well as gentle stretching.  Recommend lowering or maintaining BMI within a healthy range to reduce symptoms.   Patient encouraged to call with questions or concerns prior to follow up.  Will discuss with attending as needed.   Consider additional referrals, work up and/or advanced imaging as indicated or if patient fails to respond to conservative care.    Return in about 13 days (around 7/24/2025) for Recheck. If symptoms change call for sooner appt..    Patient or patient representative verbalized consent for the use of Ambient Listening during the visit with  ROSA Gavin for chart documentation. 7/11/2025  16:10 EDT        ROSA Urban    Dictation software was used to complete a portion or all of this note.

## 2025-07-24 ENCOUNTER — OFFICE VISIT (OUTPATIENT)
Dept: ORTHOPEDIC SURGERY | Facility: CLINIC | Age: 45
End: 2025-07-24
Payer: COMMERCIAL

## 2025-07-24 VITALS — TEMPERATURE: 98.4 F | BODY MASS INDEX: 28.12 KG/M2 | WEIGHT: 179.2 LBS | HEIGHT: 67 IN

## 2025-07-24 DIAGNOSIS — M22.2X1 PATELLOFEMORAL PAIN SYNDROME OF RIGHT KNEE: Primary | ICD-10-CM

## 2025-07-24 DIAGNOSIS — M25.561 ACUTE PAIN OF RIGHT KNEE: ICD-10-CM

## 2025-07-24 DIAGNOSIS — M54.10 RADICULAR PAIN OF LOWER EXTREMITY: ICD-10-CM

## 2025-07-24 RX ORDER — MELOXICAM 15 MG/1
15 TABLET ORAL DAILY
COMMUNITY
Start: 2025-07-15

## 2025-07-24 RX ORDER — METHYLPREDNISOLONE ACETATE 80 MG/ML
80 INJECTION, SUSPENSION INTRA-ARTICULAR; INTRALESIONAL; INTRAMUSCULAR; SOFT TISSUE
Status: COMPLETED | OUTPATIENT
Start: 2025-07-24 | End: 2025-07-24

## 2025-07-24 RX ORDER — NAPROXEN 500 MG/1
500 TABLET ORAL
COMMUNITY
Start: 2025-07-02

## 2025-07-24 RX ORDER — GABAPENTIN 300 MG/1
300 CAPSULE ORAL
COMMUNITY
Start: 2025-07-02

## 2025-07-24 RX ORDER — LIDOCAINE HYDROCHLORIDE 10 MG/ML
2 INJECTION, SOLUTION EPIDURAL; INFILTRATION; INTRACAUDAL; PERINEURAL
Status: COMPLETED | OUTPATIENT
Start: 2025-07-24 | End: 2025-07-24

## 2025-07-24 RX ADMIN — METHYLPREDNISOLONE ACETATE 80 MG: 80 INJECTION, SUSPENSION INTRA-ARTICULAR; INTRALESIONAL; INTRAMUSCULAR; SOFT TISSUE at 15:56

## 2025-07-24 RX ADMIN — LIDOCAINE HYDROCHLORIDE 2 ML: 10 INJECTION, SOLUTION EPIDURAL; INFILTRATION; INTRACAUDAL; PERINEURAL at 15:56

## 2025-07-24 NOTE — PROGRESS NOTES
[facility]              Follow Up      Patient Name: Bella Jones  : 1980  Primary Care Physician: Stephany Osuna APRN        Chief Complaint:  Right knee and leg pain    HPI:   Bella Jones is a 45 y.o. year old who presents today for evaluation.  I last saw her 2 weeks ago for complaints of right knee and leg pain.  She has somewhat of an atypical presentation with hypersensitivity suspicious for nerve pain, she did seem to have some pain and an effusion specific to the knee as well.  She had been to the emergency room and had imaging as well as a Doppler which was negative for DVT.  Initially they thought she may be having something along the lines of shingles but she never developed any sort of a rash.    Last visit we started her on Medrol Dosepak it was 1 thing that she had not tried thus far.  She did get some mild relief in her symptoms.  She also followed up with her primary care nurse practitioner who took a closer look at her lumbar spine with some x-rays and started her on meloxicam as well as gabapentin with an order for physical therapy.  She continues to have pain somewhat throughout the leg mostly anterior in the proximal thigh somewhat lateral but it does wrap around the medial portion as well and she continues to have hypersensitivity.  History of Present Illness        Past Medical/Surgical, Social and Family History:  I have reviewed and/or updated pertinent history as noted in the medical record including:  History reviewed. No pertinent past medical history.  History reviewed. No pertinent surgical history.  Social History     Occupational History    Not on file   Tobacco Use    Smoking status: Never     Passive exposure: Never    Smokeless tobacco: Never   Vaping Use    Vaping status: Never Used   Substance and Sexual Activity    Alcohol use: Yes    Drug use: Never    Sexual activity: Yes     Partners: Male          Allergies:   Allergies   Allergen Reactions     Amoxicillin Itching    Codeine Nausea And Vomiting       Medications:   Home Medications:  Current Outpatient Medications on File Prior to Visit   Medication Sig    acetaminophen (TYLENOL) 500 MG tablet Take 2 tablets by mouth Every 8 (Eight) Hours As Needed for Mild Pain.    escitalopram (LEXAPRO) 10 MG tablet Take 1 tablet by mouth Daily.    fexofenadine (ALLEGRA) 60 MG tablet Take 1 tablet by mouth Daily.    gabapentin (NEURONTIN) 300 MG capsule Take 1 capsule by mouth.    ibuprofen (ADVIL,MOTRIN) 600 MG tablet Take 1 tablet by mouth Every 6 (Six) Hours As Needed for Mild Pain.    meloxicam (MOBIC) 15 MG tablet Take 1 tablet by mouth Daily.    multivitamin (THERAGRAN) tablet tablet Take  by mouth.    OMEPRAZOLE PO Take  by mouth.    Diclofenac Sodium (VOLTAREN) 1 % gel gel Apply 4 g topically to the appropriate area as directed 4 (Four) Times a Day As Needed (pain). (Patient not taking: Reported on 7/24/2025)    methylPREDNISolone (MEDROL) 4 MG dose pack Use as directed by package instructions (Patient not taking: Reported on 7/24/2025)    naproxen (EC-Naprosyn) 500 MG EC tablet 1 tablet. (Patient not taking: Reported on 7/24/2025)     No current facility-administered medications on file prior to visit.         ROS:  ROS negative except as listed in the HPI.    Physical Exam:   45 y.o. female  Body mass index is 28.07 kg/m²., 81.3 kg (179 lb 3.2 oz)  Vitals:    07/24/25 1528   Temp: 98.4 °F (36.9 °C)     General: Alert, cooperative, appears well and in no observable distress. Appears stated age and BMI as listed above.  HEENT: Normocephalic, atraumatic on external visual inspection.  CV: No significant peripheral edema.  Respiratory: Normal respiratory effort.  Skin: Warm & well perfused; appropriate skin turgor.  Psych: Appropriate mood & affect.  Neuro: Gross sensation and motor intact in affected extremity/extremities.  Vascular: Peripheral pulses palpable in affected extremity/extremities.   Physical  Exam      MSK Exam:  Right Knee  No deformity or wounds appreciated. No significant redness or warmth.  Trace effusion noted  Tenderness along the joint line appreciated patellofemoral compartment- laterally along the femoral condyle. She does have some hypersensitivity over the skin somewhat diffuse. No rashes or lesions. Painful Tinel sign over the fibular head but no radiation.  ROM 2-100, +crepitus  Ligamentous exam grossly stable  Negative Leeroy  -Bounce Home  Quad strength 4 /5 and painful, Ham 5/5, hip flexion 4+/5. DF/PF 5/5      Left Knee  No deformity or wounds appreciated. No significant redness or warmth.  No significant effusion noted.  No significant tenderness appreciated about the joint.  ROM 0-130 and painless.  Ligamentous exam grossly stable  Quad strength 4+ to 5/5     Brief hip exam in the affected extremity(ies) grossly unremarkable.  Moves ankle and toes up and down, no significant pain or swelling in the foot, ankle or calf. No significant pain with passive SLR of the RLE.     Radiology:    No new images were needed at the visit today.     7/11/2025: Single Knee: AP standing and sunrise views of both knees, and lateral view of painful knee show Some mild degenerative changes in both knees on the AP view.  She still has adequate joint space in the weightbearing compartments.  Lateral view suggest little subtle patellofemoral arthritis I appreciate an effusion otherwise no acute or chronic bony pathology to account for symptoms.  I compared these with her prior films from the ER which were not on standing films.  Results  Imaging   - Ultrasound of the knee: Negative for DVT   - X-ray of the knee: Showed fluid in the joint        EXAM: XR LUMBAR SPINE 2-3 VIEWS     DATE: 7/16/2025 13:51     CLINICAL HISTORY:  Lumbar radiculopathy     COMPARISON: None.     FINDINGS: 3 images of the lumbar spine provided. Slight leftward curvature apex at   L3-L4. Straightening of the mid to upper lumbar  lordosis. Degenerative grade 1   retrolisthesis at L3-L4. No fracture or aggressive bony lesions. Multilevel   intervertebral disc space height loss and spondylotic changes. Sacroiliac joints are   congruent.     IMPRESSION:     Slight leftward curvature of the lumbar spine accompanied by multilevel degenerative   changes.     No fracture identified.     Results      Procedure:   See Procedure Note: The potential risks and benefits of performing a diagnostic and therapeutic injection were discussed with the patient prior to procedure. Risks include, but are not limited to infection, swelling, transient increase in pain, bleeding, bruising. Patient was advised that injections are a diagnostic and therapeutic tool meaning they may not alleviate symptoms at all, or may only provide partial or temporary relief. Injection precautions and aftercare discussed.      [facility] Data/Labs: N/A    Assessment & Plan:      ICD-10-CM ICD-9-CM   1. Patellofemoral pain syndrome of right knee  M22.2X1 719.46   2. Acute pain of right knee  M25.561 719.46   3. Radicular pain of lower extremity  M54.10 724.4     No orders of the defined types were placed in this encounter.    Orders Placed This Encounter   Procedures    Large Joint Arthrocentesis: R knee     Patient still has somewhat of a puzzling clinical picture.  I do think she has specific knee pathology but some of her symptoms really sound more radicular in nature.  Her x-rays of her spine I was not able to see those but I did read the report and it does show some degenerative changes that could potentially account for some of her symptoms.  I think she has some degree of patellofemoral pain as well.  Right now she has exhausted all of the usual oral medication management I would like her to continue on the gabapentin and meloxicam and we talked about a injection of her right knee as a diagnostic and therapeutic tool.  I really do not want her to stop any of her oral medications just  yet so that we can better assess how much the injection helps her or not. She is also going to get started with PT next week at [facility] which I would support as well.    I would like her to reach out to me in a couple of weeks and let me know how she is doing.  If nothing else we may consider an MRI of that knee just to rule that out as a source of symptoms.  Similarly if she continues to have this hypersensitivity after a couple more weeks I would like to get her over to see Laura Lara for further evaluation and treatment.  Assessment & Plan      Continue with activity modifications as needed/discussed.  Continue ICE and/or HEAT PRN.  Recommend to continue activity as tolerated, focus on quad and hip/core strengthening as well as gentle stretching.  Recommend lowering or maintaining BMI within a healthy range to reduce symptoms.   Patient encouraged to call with questions or concerns prior to follow up.  Will discuss with attending as needed.   Consider additional referrals, work up and/or advanced imaging as indicated or if patient fails to respond to conservative care.    Return in about 2 weeks (around 8/7/2025).            Large Joint Arthrocentesis: R knee  Date/Time: 7/24/2025 3:56 PM  Consent given by: patient  Site marked: site marked  Timeout: Immediately prior to procedure a time out was called to verify the correct patient, procedure, equipment, support staff and site/side marked as required   Supporting Documentation  Indications: pain   Procedure Details  Location: knee - R knee  Preparation: Patient was prepped and draped in the usual sterile fashion  Needle gauge: 21G.  Approach: anterolateral  Medications administered: 80 mg methylPREDNISolone acetate 80 MG/ML; 2 mL lidocaine PF 1% 1 %  Patient tolerance: patient tolerated the procedure well with no immediate complications         ROSA Urban    Dictation software was used to complete a portion or all of this note.

## 2025-08-11 ENCOUNTER — PATIENT MESSAGE (OUTPATIENT)
Dept: ORTHOPEDIC SURGERY | Facility: CLINIC | Age: 45
End: 2025-08-11
Payer: COMMERCIAL

## 2025-08-11 DIAGNOSIS — M54.50 LUMBAR SPINE PAIN: Primary | ICD-10-CM

## 2025-08-18 ENCOUNTER — OFFICE VISIT (OUTPATIENT)
Dept: ORTHOPEDIC SURGERY | Facility: CLINIC | Age: 45
End: 2025-08-18
Payer: COMMERCIAL

## 2025-08-18 VITALS — BODY MASS INDEX: 28.06 KG/M2 | HEIGHT: 67 IN | TEMPERATURE: 98.3 F | WEIGHT: 178.8 LBS

## 2025-08-18 DIAGNOSIS — M54.16 LUMBAR RADICULOPATHY: Primary | ICD-10-CM

## 2025-08-18 DIAGNOSIS — M22.2X1 PATELLOFEMORAL PAIN SYNDROME OF RIGHT KNEE: ICD-10-CM

## 2025-08-18 PROCEDURE — 99213 OFFICE O/P EST LOW 20 MIN: CPT | Performed by: NURSE PRACTITIONER
